# Patient Record
Sex: MALE | Race: WHITE | NOT HISPANIC OR LATINO | Employment: OTHER | URBAN - METROPOLITAN AREA
[De-identification: names, ages, dates, MRNs, and addresses within clinical notes are randomized per-mention and may not be internally consistent; named-entity substitution may affect disease eponyms.]

---

## 2018-06-11 ENCOUNTER — TELEPHONE (OUTPATIENT)
Dept: UROLOGY | Facility: AMBULATORY SURGERY CENTER | Age: 63
End: 2018-06-11

## 2018-06-11 NOTE — TELEPHONE ENCOUNTER
Complaint:  Enlarged lymph nodes / advised to followup with Urologist    Insurance:  Medicare    History of Cancer? No    Previous Urologist:  No    Biopsy done at [place] [address] paperwork mailed

## 2018-06-14 RX ORDER — METOPROLOL SUCCINATE 50 MG/1
50 TABLET, EXTENDED RELEASE ORAL
COMMUNITY
Start: 2018-02-20

## 2018-06-14 RX ORDER — GABAPENTIN 600 MG/1
600 TABLET ORAL
COMMUNITY
Start: 2018-04-23

## 2018-06-14 RX ORDER — LISINOPRIL 10 MG/1
10 TABLET ORAL
COMMUNITY
Start: 2018-02-20

## 2018-06-14 RX ORDER — CYCLOBENZAPRINE HCL 10 MG
10 TABLET ORAL 3 TIMES DAILY
COMMUNITY
Start: 2018-04-23

## 2018-06-14 RX ORDER — ATORVASTATIN CALCIUM 80 MG/1
80 TABLET, FILM COATED ORAL
COMMUNITY
Start: 2018-02-20

## 2018-06-14 RX ORDER — DULOXETIN HYDROCHLORIDE 30 MG/1
30 CAPSULE, DELAYED RELEASE ORAL
COMMUNITY
Start: 2015-04-15 | End: 2019-02-20

## 2018-06-14 RX ORDER — GABAPENTIN 400 MG/1
400 CAPSULE ORAL
COMMUNITY
Start: 2018-04-23 | End: 2019-04-23

## 2018-06-18 ENCOUNTER — OFFICE VISIT (OUTPATIENT)
Dept: UROLOGY | Facility: MEDICAL CENTER | Age: 63
End: 2018-06-18
Payer: MEDICARE

## 2018-06-18 VITALS
SYSTOLIC BLOOD PRESSURE: 108 MMHG | DIASTOLIC BLOOD PRESSURE: 68 MMHG | HEIGHT: 70 IN | WEIGHT: 150 LBS | BODY MASS INDEX: 21.47 KG/M2

## 2018-06-18 DIAGNOSIS — C61 MALIGNANT NEOPLASM OF PROSTATE (HCC): Primary | ICD-10-CM

## 2018-06-18 PROCEDURE — 99204 OFFICE O/P NEW MOD 45 MIN: CPT | Performed by: UROLOGY

## 2018-06-18 RX ORDER — OXYCODONE HYDROCHLORIDE 10 MG/1
TABLET, FILM COATED, EXTENDED RELEASE ORAL
COMMUNITY
Start: 2018-06-16

## 2018-06-18 RX ORDER — CLOPIDOGREL BISULFATE 75 MG/1
TABLET ORAL
COMMUNITY
Start: 2018-05-25

## 2018-06-18 RX ORDER — MULTIVITAMIN
1 TABLET ORAL DAILY
COMMUNITY

## 2018-06-18 RX ORDER — ASPIRIN 81 MG/1
81 TABLET ORAL DAILY
COMMUNITY

## 2018-06-18 RX ORDER — OXYCODONE HYDROCHLORIDE 5 MG/1
TABLET ORAL
COMMUNITY
Start: 2018-06-16
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
## 2018-06-19 ENCOUNTER — TELEPHONE (OUTPATIENT)
Dept: UROLOGY | Facility: MEDICAL CENTER | Age: 63
End: 2018-06-19

## 2018-06-20 ENCOUNTER — TELEPHONE (OUTPATIENT)
Dept: UROLOGY | Facility: MEDICAL CENTER | Age: 63
End: 2018-06-20

## 2018-06-20 NOTE — TELEPHONE ENCOUNTER
Friend questioning if 2 weeks too far out for pt to come back for results and to start medication  Questioning if he should return in 2 days after Scan  Explained results will not be final in 2 days  Will relay his concerns to Dr Kwadwo Lr

## 2018-06-26 RX ORDER — BICALUTAMIDE 50 MG/1
50 TABLET, FILM COATED ORAL DAILY
COMMUNITY
Start: 2018-06-19 | End: 2019-06-19

## 2018-07-03 ENCOUNTER — TELEPHONE (OUTPATIENT)
Dept: UROLOGY | Facility: MEDICAL CENTER | Age: 63
End: 2018-07-03

## 2018-07-03 ENCOUNTER — OFFICE VISIT (OUTPATIENT)
Dept: UROLOGY | Facility: MEDICAL CENTER | Age: 63
End: 2018-07-03
Payer: MEDICARE

## 2018-07-03 VITALS
SYSTOLIC BLOOD PRESSURE: 114 MMHG | WEIGHT: 150 LBS | BODY MASS INDEX: 21.47 KG/M2 | HEIGHT: 70 IN | DIASTOLIC BLOOD PRESSURE: 78 MMHG

## 2018-07-03 DIAGNOSIS — C61 MALIGNANT NEOPLASM PROSTATE (HCC): Primary | ICD-10-CM

## 2018-07-03 PROCEDURE — 81003 URINALYSIS AUTO W/O SCOPE: CPT | Performed by: UROLOGY

## 2018-07-03 PROCEDURE — 99213 OFFICE O/P EST LOW 20 MIN: CPT | Performed by: UROLOGY

## 2018-07-03 NOTE — TELEPHONE ENCOUNTER
----- Message from Brittany Georges sent at 7/3/2018 12:57 PM EDT -----  Regarding: lupron  Pt's next lupron is 7-17-18 with nurse

## 2018-07-03 NOTE — PROGRESS NOTES
IPSS Questionnaire (AUA-7): Over the past month    1)  How often have you had a sensation of not emptying your bladder completely after you finish urinating? 0 - Not at all   2)  How often have you had to urinate again less than two hours after you finished urinating? 0 - Not at all   3)  How often have you found you stopped and started again several times when you urinated? 0 - Not at all   4) How difficult have you found it to postpone urination? 0 - Not at all   5) How often have you had a weak urinary stream?  4 - More than half the time   6) How often have you had to push or strain to begin urination? 0 - Not at all   7) How many times did you most typically get up to urinate from the time you went to bed until the time you got up in the morning?  0 - None   Total Score:  4     QOL: Mostly satisfied

## 2018-07-03 NOTE — ASSESSMENT & PLAN NOTE
The patient will begin bicalutamide immediately  He will return for a Lupron injection in 2 weeks  A PSA will be checked in 3 months and his next office visit will be in 6 months, in time for his next Lupron  A PSA will be obtained then, also

## 2018-07-03 NOTE — LETTER
July 3, 2018     Maynor Moore MD  [facility] [address]    Patient: Marycarmen Jones   YOB: 1955   Date of Visit: 7/3/2018       Dear Dr Wei Bansal: Thank you for referring Ingrid Goff to me for evaluation  Below are my notes for this consultation  If you have questions, please do not hesitate to call me  I look forward to following your patient along with you  Sincerely,        Shania Burden MD        CC: No Recipients  Shania Burden MD  7/3/2018  1:58 PM  Sign at close encounter  Assessment/Plan:    No problem-specific Assessment & Plan notes found for this encounter  Diagnoses and all orders for this visit:    Malignant neoplasm prostate ([address] )  -     Cancel: POCT urine dip auto non-scope  -     XR shoulder 2+ vw right; Future  -     XR sacrum and coccyx; Future  -     PSA, Total Screen; Future  -     Testosterone, free, total; Future          Subjective:      Patient ID: Marycarmen Jones is a 66 y o  male  HPI  Prostate cancer, metastatic:   Pt has not started bicalutamide yet  He was waiting for the results of the bone scan  The scan shows metastatic disease in the right side of the sacrum  The patient has increased activity in the shoulder which may be arthritic  The patient has constant pain in the shoulder, however the possibility of metastatic disease will be pursued with an x-ray  The patient also has fairly constant sacral pain  X-rays will be obtained of this area as well  If prostate cancer is responsible for  the pain in either of these sites, hormonal medication should give fairly prompt relief  After 2 weeks of bicalutamide, the patient will return for a 6 month dose of Lupron  A PSA level and testosterone level bit will be obtained in 3 months  This will be used to assess the success of the treatment  The patient is accompanied by a close friend        The following portions of the patient's history were reviewed and updated as appropriate: allergies, current medications, past family history, past medical history, past social history, past surgical history and problem list     Review of Systems    Constitutional: Positive for activity change and fatigue  HENT: Negative  Eyes: Negative  Presbyopia   Respiratory:        One pack cigarette smoking daily   Gastrointestinal:        History of colonic polyps   Endocrine: Negative  Genitourinary: Negative  Musculoskeletal: Negative  Allergic/Immunologic: Negative  Neurological: Negative  Hematological: Negative  Psychiatric/Behavioral: Negative  Objective:      /78 (BP Location: Left arm, Patient Position: Sitting, Cuff Size: Standard)   Ht 5' 10" (1 778 m)   Wt 68 kg (150 lb)   BMI 21 52 kg/m²           Physical Exam   Constitutional: He is oriented to person, place, and time  He appears well-developed and well-nourished  HENT:   Head: Normocephalic and atraumatic  Neck: Normal range of motion  Neck supple  Pulmonary/Chest: Effort normal    Musculoskeletal: Normal range of motion  Neurological: He is alert and oriented to person, place, and time  He has normal reflexes  Skin: Skin is warm and dry  Psychiatric: He has a normal mood and affect   His behavior is normal  Judgment and thought content normal

## 2018-07-03 NOTE — PROGRESS NOTES
Assessment/Plan:    Malignant neoplasm prostate [facility])    The patient will begin bicalutamide immediately  He will return for a Lupron injection in 2 weeks  A PSA will be checked in 3 months and his next office visit will be in 6 months, in time for his next Lupron  A PSA will be obtained then, also  Diagnoses and all orders for this visit:    Malignant neoplasm prostate ([address] )  -     Cancel: POCT urine dip auto non-scope  -     XR shoulder 2+ vw right; Future  -     XR sacrum and coccyx; Future  -     PSA, Total Screen; Future  -     Testosterone, free, total; Future          Subjective:      Patient ID: Cathy Monroe is a 66 y o  male  HPI  Prostate cancer, metastatic:   Pt has not started bicalutamide yet  He was waiting for the results of the bone scan  The scan shows metastatic disease in the right side of the sacrum  The patient has increased activity in the shoulder which may be arthritic  The patient has constant pain in the shoulder, however the possibility of metastatic disease will be pursued with an x-ray  The patient also has fairly constant sacral pain  X-rays will be obtained of this area as well  If prostate cancer is responsible for  the pain in either of these sites, hormonal medication should give fairly prompt relief  After 2 weeks of bicalutamide, the patient will return for a 6 month dose of Lupron  A PSA level and testosterone level bit will be obtained in 3 months  This will be used to assess the success of the treatment  The patient is accompanied by a close friend  The following portions of the patient's history were reviewed and updated as appropriate: allergies, current medications, past family history, past medical history, past social history, past surgical history and problem list     Review of Systems    Constitutional: Positive for activity change and fatigue  HENT: Negative  Eyes: Negative           Presbyopia   Respiratory:        One pack cigarette smoking daily   Gastrointestinal:        History of colonic polyps   Endocrine: Negative  Genitourinary: Negative  Musculoskeletal: Negative  Allergic/Immunologic: Negative  Neurological: Negative  Hematological: Negative  Psychiatric/Behavioral: Negative  Objective:      /78 (BP Location: Left arm, Patient Position: Sitting, Cuff Size: Standard)   Ht 5' 10" (1 778 m)   Wt 68 kg (150 lb)   BMI 21 52 kg/m²          Physical Exam   Constitutional: He is oriented to person, place, and time  He appears well-developed and well-nourished  HENT:   Head: Normocephalic and atraumatic  Neck: Normal range of motion  Neck supple  Pulmonary/Chest: Effort normal    Musculoskeletal: Normal range of motion  Neurological: He is alert and oriented to person, place, and time  He has normal reflexes  Skin: Skin is warm and dry  Psychiatric: He has a normal mood and affect   His behavior is normal  Judgment and thought content normal

## 2018-07-17 ENCOUNTER — CLINICAL SUPPORT (OUTPATIENT)
Dept: UROLOGY | Facility: MEDICAL CENTER | Age: 63
End: 2018-07-17
Payer: MEDICARE

## 2018-07-17 DIAGNOSIS — C61 PROSTATE CANCER (HCC): Primary | ICD-10-CM

## 2018-07-17 DIAGNOSIS — C61 MALIGNANT NEOPLASM PROSTATE (HCC): ICD-10-CM

## 2018-07-17 PROCEDURE — 99211 OFF/OP EST MAY X REQ PHY/QHP: CPT

## 2018-07-17 PROCEDURE — 96402 CHEMO HORMON ANTINEOPL SQ/IM: CPT

## 2018-07-17 NOTE — PROGRESS NOTES
Patient presents for 1st injection of 6 month Lupron  Right buttock was sterilely prepped and Lueprolide Acetate was injected  Patient to follow up in 6 months with Dr Sourav Winston

## 2018-07-20 ENCOUNTER — TELEPHONE (OUTPATIENT)
Dept: UROLOGY | Facility: MEDICAL CENTER | Age: 63
End: 2018-07-20

## 2018-07-20 NOTE — TELEPHONE ENCOUNTER
Please advise  Pt was asking if he should still be taking the Casodex the last office note 7/3 stated that after 2 weeks of the Casodex he will come in for the 6 month Lupron  He was in on Tuesday for his 6 month inj  He was a little unclear if he is to continue

## 2018-08-03 ENCOUNTER — TELEPHONE (OUTPATIENT)
Dept: UROLOGY | Facility: MEDICAL CENTER | Age: 63
End: 2018-08-03

## 2018-08-03 NOTE — TELEPHONE ENCOUNTER
[facility] medical records request received for all progress notes   Records mailed to [place] [address] [address]

## 2018-08-10 NOTE — PROGRESS NOTES
X-rays confirmed presence of metastatic disease in sacrum  Continue current plan of hormonal management  Please inform the patient